# Patient Record
Sex: FEMALE | Race: WHITE | Employment: UNEMPLOYED | URBAN - METROPOLITAN AREA
[De-identification: names, ages, dates, MRNs, and addresses within clinical notes are randomized per-mention and may not be internally consistent; named-entity substitution may affect disease eponyms.]

---

## 2020-01-27 ENCOUNTER — HOSPITAL ENCOUNTER (OUTPATIENT)
Dept: NEUROLOGY | Age: 45
Discharge: HOME | End: 2020-01-27
Payer: COMMERCIAL

## 2020-01-27 PROCEDURE — 95911 NRV CNDJ TEST 9-10 STUDIES: CPT | Performed by: PSYCHIATRY & NEUROLOGY

## 2020-01-27 PROCEDURE — 95911 NRV CNDJ TEST 9-10 STUDIES: CPT

## 2020-01-27 PROCEDURE — 95886 MUSC TEST DONE W/N TEST COMP: CPT | Performed by: PSYCHIATRY & NEUROLOGY

## 2020-04-10 ENCOUNTER — HOSPITAL ENCOUNTER (EMERGENCY)
Age: 45
Discharge: HOME | End: 2020-04-11
Attending: EMERGENCY MEDICINE

## 2020-04-10 PROCEDURE — 99283 EMERGENCY DEPT VISIT LOW MDM: CPT

## 2020-04-10 ASSESSMENT — PAIN DESCRIPTION - PAIN TYPE: TYPE: ACUTE PAIN

## 2020-04-10 ASSESSMENT — PAIN SCALES - GENERAL: PAINLEVEL_OUTOF10: 7

## 2020-04-10 ASSESSMENT — PAIN DESCRIPTION - LOCATION: LOCATION: THROAT

## 2020-04-10 ASSESSMENT — PAIN DESCRIPTION - FREQUENCY: FREQUENCY: CONTINUOUS

## 2020-04-11 ENCOUNTER — APPOINTMENT (OUTPATIENT)
Dept: GENERAL RADIOLOGY | Age: 45
End: 2020-04-11

## 2020-04-11 VITALS
HEART RATE: 104 BPM | BODY MASS INDEX: 25.11 KG/M2 | HEIGHT: 67 IN | SYSTOLIC BLOOD PRESSURE: 111 MMHG | WEIGHT: 160 LBS | TEMPERATURE: 99.3 F | OXYGEN SATURATION: 99 % | RESPIRATION RATE: 18 BRPM | DIASTOLIC BLOOD PRESSURE: 83 MMHG

## 2020-04-11 PROCEDURE — 71046 X-RAY EXAM CHEST 2 VIEWS: CPT

## 2020-04-11 RX ORDER — ACETAMINOPHEN 325 MG/1
650 TABLET ORAL ONCE
Status: DISCONTINUED | OUTPATIENT
Start: 2020-04-11 | End: 2020-04-11 | Stop reason: HOSPADM

## 2020-04-11 NOTE — ED NOTES
Pt discharged to home, alert and oriented. Denies any questions about discharge instructions. Will follow up as directed. encouraged to return for any worsening symptoms. sts that she does not want strep test or any further treatment.       Demarcus Antoine RN  04/11/20 0739

## 2020-04-11 NOTE — ED PROVIDER NOTES
for Pain for 30 doses. , Disp-30 tablet, R-0Print      oxyCODONE-acetaminophen (PERCOCET) 5-325 MG per tablet Take 1 tablet by mouth every 6 hours as needed for Pain for 6 doses. , Disp-6 tablet, R-0Print             Allergies     She is allergic to darvocet [propoxyphene n-acetaminophen]; dermabond; morphine; ultram [tramadol hcl]; and vicodin [hydrocodone-acetaminophen]. Physical Exam     INITIAL VITALS: BP: 109/76, Temp: 99.3 °F (37.4 °C), Pulse: 103, Resp: 20, SpO2: 98 %   Constitutional:  Well developed, No respiratory distress. Eyes:  Sclera anicteric, conjunctiva normal.  HEENT:  Atraumatic, external ears normal.  Mild erythema of the oropharynx with no exudates. No trismus. No tonsillar asymmetry. Uvula midline. Neck: Normal range of motion. Respiratory:  No respiratory distress, no accessory muscle use. Clear to auscultation bilaterally. Cardiovascular:  Skin appears well perfused  Musculoskeletal:  No deformities. Neurologic:  Awake and alert. Moves all four extremities. Psychiatric:  Behavior appropriate. Diagnostic Results     RADIOLOGY:  XR CHEST STANDARD (2 VW)   Final Result      No acute pulmonary pathology or change from the prior study                      LABS:   No results found for this visit on 04/10/20. RECENT VITALS:  BP: 111/83,Temp: 99.3 °F (37.4 °C), Pulse: 104, Resp: 18, SpO2: 99 %     ED Course     Nursing Notes, Past Medical Hx, Past Surgical Hx, Social Hx,Allergies, and Family Hx were reviewed. Patient was given the following medications:  Orders Placed This Encounter   Medications    DISCONTD: acetaminophen (TYLENOL) tablet 650 mg       MEDICAL DECISIONMAKING / ASSESSMENT / Nicoletteshweta Nam is a 50 y.o. female who presented to the emergency department for evaluation of respiratory symptoms in setting of COVID-19 pandemic. They were given a surgical mask upon arrival. CXR without pneumonia. Would not wait for strep swab to come back.  Appears well and

## 2020-04-13 ENCOUNTER — CARE COORDINATION (OUTPATIENT)
Dept: CARE COORDINATION | Age: 45
End: 2020-04-13